# Patient Record
(demographics unavailable — no encounter records)

---

## 2021-09-05 NOTE — EDM.PDOC
ED HPI GENERAL MEDICAL PROBLEM





- General


Chief Complaint: General


Stated Complaint: "I have a cold"


Time Seen by Provider: 09/05/21 02:00


Source of Information: Reports: Patient


History Limitations: Reports: No Limitations





- History of Present Illness


INITIAL COMMENTS - FREE TEXT/NARRATIVE: 





Brenda is a 69 year old female who presents to ER with complaints of a bad cold.

 Has had for 12 days now and has not been able to sleep for the last 3 days.  

Has been up in the chair much of the time as when she lays down, the cough is 

much worse.  Has been moist but nonproductive.  Now developing ear pain and a 

sore throat.  Denies shortness of breath or wheezing.  No fever.  Did contact 

Dr. Goss on Friday by email and was placed on a cough pill but hasn't found 

that to be effective.  Has had the covid vaccine.  


Onset: Gradual


Duration: Day(s):, Getting Worse


Location: Reports: Head, Chest


Quality: Reports: Ache


Severity: Mild


Associated Symptoms: Reports: Cough.  Denies: Confusion, Chest Pain, cough w 

sputum, Fever/Chills, Loss of Appetite, Nausea/Vomiting, Shortness of Breath


Treatments PTA: Reports: Other Medication(s) (cough medicine)


  ** L EAR AND THROAT


Pain Score (Numeric/FACES): 8





- Related Data


                                    Allergies











Allergy/AdvReac Type Severity Reaction Status Date / Time


 


No Known Allergies Allergy   Verified 09/05/21 02:04











Home Meds: 


                                    Home Meds





Cefuroxime [Ceftin] 250 mg PO BID #16 tab 09/05/21 [Rx]


Cholecalciferol (Vitamin D3) [Vitamin D] 5,000 units PO DAILY 09/05/21 [History]


Magnesium 500 mg PO DAILY 09/05/21 [History]


Zinc 50 mg PO DAILY 09/05/21 [History]


amLODIPine [Norvasc] 5 mg PO DAILY 09/05/21 [History]


atorvaSTATin [Lipitor] 20 mg PO DAILY 09/05/21 [History]


hydroCHLOROthiazide [Hydrochlorothiazide] 25 mg PO DAILY 09/05/21 [History]


lisinopriL [Lisinopril] 40 mg PO DAILY 09/05/21 [History]


predniSONE 20 mg PO WITHBREAKFAST #6 tab 09/05/21 [Rx]











Past Medical History


Cardiovascular History: Reports: High Cholesterol, Hypertension


Oncologic (Cancer) History: Reports: Breast





Social & Family History





- Tobacco Use


Tobacco Use Status *Q: Unknown Ever Used Tobacco





ED ROS GENERAL





- Review of Systems


Review Of Systems: See Below


Constitutional: Reports: Chills, Malaise, Weakness, Fatigue.  Denies: Fever, 

Decreased Appetite


HEENT: Reports: Ear Pain, Rhinitis, Throat Pain.  Denies: Sinus Problem


Respiratory: Reports: Cough.  Denies: Shortness of Breath, Sputum


Cardiovascular: Denies: Chest Pain, Edema, Lightheadedness


Endocrine: Denies: Fatigue


GI/Abdominal: Denies: Abdominal Pain, Constipation, Diarrhea, Nausea, Vomiting


: Denies: Dysuria, Flank Pain


Musculoskeletal: Reports: No Symptoms


Skin: Reports: No Symptoms


Neurological: Reports: Weakness





ED EXAM, GENERAL





- Physical Exam


Exam: See Below


Exam Limited By: No Limitations


General Appearance: Alert, WD/WN, No Apparent Distress


Ears: Normal External Exam, Normal TMs


Nose: Normal Inspection, Normal Mucosa, Nasal Drainage


Throat/Mouth: Normal Inspection, Normal Oropharynx


Head: Normocephalic


Neck: Normal Inspection, Supple, Non-Tender


Respiratory/Chest: No Respiratory Distress, Lungs Clear, Normal Breath Sounds


Cardiovascular: Regular Rate, Rhythm


GI/Abdominal: Normal Bowel Sounds, Soft, Non-Tender


Extremities: Normal Inspection, No Pedal Edema


Neurological: Alert, Oriented


Skin Exam: Warm, Dry





Course





- Vital Signs


Last Recorded V/S: 


                                Last Vital Signs











Temp  97 F   09/05/21 02:25


 


Pulse  91   09/05/21 02:25


 


Resp  20   09/05/21 02:25


 


BP  130/73   09/05/21 02:25


 


Pulse Ox  97   09/05/21 02:25














- Orders/Labs/Meds


Orders: 


                               Active Orders 24 hr











 Category Date Time Status


 


 Chest 2V [CR] Stat Exams  09/05/21 02:03 Ordered











Labs: 


                                Laboratory Tests











  09/05/21 09/05/21 09/05/21 Range/Units





  01:59 02:18 02:18 


 


WBC   8.6   (4.0-11.0)  10^3/uL


 


RBC   4.60   (4.00-5.50)  x10^6/uL


 


Hgb   13.3   (12.0-16.0)  g/dL


 


Hct   40.3   (37.0-47.0)  %


 


MCV   87.6   (83.0-97.0)  fL


 


MCH   28.9   (27.0-32.0)  pg


 


MCHC   33.0   (32.0-36.0)  g/dL


 


RDW Coeff of [place]   13.1   (11.0-15.0)  %


 


Plt Count   260   (150-400)  10^3/uL


 


Immature Gran % (Auto)   0.1   (0.0-4.9)  %


 


Neut % (Auto)   71.3 H   (41-71)  %


 


Lymph % (Auto)   17.7 L   (24-44)  %


 


Mono % (Auto)   7.3   (0-10)  %


 


Eos % (Auto)   3.0   (0-6)  %


 


Baso % (Auto)   0.6   (0-1)  %


 


Neut # (Auto)   6.10   (1.80-8.00)  x10^3/uL


 


Lymph # (Auto)   1.51   (0.60-5.00)  10^3/uL


 


Mono # (Auto)   0.62   (0.00-1.50)  10^3/uL


 


Eos # (Auto)   0.26   (0.00-1.50)  10^3/uL


 


Baso # (Auto)   0.05   (0.00-0.50)  10^3/uL


 


Immature Gran # (Auto)   0.01   (0.00-0.49)  10^3/uL


 


Sodium    141  (136-145)  mEq/L


 


Potassium    4.2  (3.5-5.0)  mEq/L


 


Chloride    104  ()  mEq/L


 


Carbon Dioxide    26  (21-32)  mmol/L


 


BUN    27 H D  (7-18)  mg/dL


 


Creatinine    1.4 H D  (0.6-1.0)  mg/dL


 


Est Cr Clr Drug Dosing    31.37  mL/min


 


Estimated GFR (MDRD)    37 L  (>=60)  mL/min


 


Glucose    105 H  (75-99)  mg/dL


 


Calcium    9.4  (8.4-10.1)  mg/dL


 


Total Bilirubin    0.6  (0.0-1.0)  mg/dL


 


AST    19  (15-37)  U/L


 


ALT    18  (12-78)  U/L


 


Alkaline Phosphatase    107  ()  U/L


 


C-Reactive Protein    4.6 H  (0.2-0.8)  mg/dL


 


Total Protein    7.9  (6.4-8.2)  g/dL


 


Albumin    3.6  (3.4-5.0)  g/dL


 


SARS CoV-2 RNA Rapid SOTO  Negative    (NEGATIVE)  














- Re-Assessments/Exams


Free Text/Narrative Re-Assessment/Exam: 





09/05/21 02:40


WBC is normal.  CRP 4.6.  Creatinine 1.4.  Chest xray shows questionable 

infiltrate to RML.  





Departure





- Departure


Time of Disposition: 02:42


Disposition: Home, Self-Care 01


Condition: Good


Clinical Impression: 


RML pneumonia


Qualifiers:


 Pneumonia type: due to unspecified organism Qualified Code(s): J18.9 - 

Pneumonia, unspecified organism








- Discharge Information


*PRESCRIPTION DRUG MONITORING PROGRAM REVIEWED*: No


*COPY OF PRESCRIPTION DRUG MONITORING REPORT IN PATIENT VENICE: No


Instructions:  Community-Acquired Pneumonia, Adult, Easy-to-Read


Forms:  ED Department Discharge


Additional Instructions: 


1.  Push fluids


2.  Tylenol for headache


3.  Prometh with codeine- 1 teaspoon every 4-6 hours at night, use your cough 

pills during the day


4.  Ceftin 250 mg twice a day for 10 days


5.  Prednisone 40 mg daily for 5 days


6.  Follow up with Dr. Goss for persisting concerns.





Sepsis Event Note (ED)





- Focused Exam


Vital Signs: 


                                   Vital Signs











  Temp Pulse Resp BP Pulse Ox


 


 09/05/21 02:25  97 F  91  20  130/73  97


 


 09/05/21 02:09  97 F  91  20  130/73  97














- My Orders


Last 24 Hours: 


My Active Orders





09/05/21 02:03


Chest 2V [CR] Stat 














- Assessment/Plan


Last 24 Hours: 


My Active Orders





09/05/21 02:03


Chest 2V [CR] Stat